# Patient Record
Sex: MALE | Race: ASIAN | NOT HISPANIC OR LATINO | URBAN - METROPOLITAN AREA
[De-identification: names, ages, dates, MRNs, and addresses within clinical notes are randomized per-mention and may not be internally consistent; named-entity substitution may affect disease eponyms.]

---

## 2019-09-24 ENCOUNTER — EMERGENCY (EMERGENCY)
Facility: HOSPITAL | Age: 24
LOS: 1 days | Discharge: HOME | End: 2019-09-24
Attending: EMERGENCY MEDICINE | Admitting: EMERGENCY MEDICINE
Payer: SELF-PAY

## 2019-09-24 VITALS
TEMPERATURE: 98 F | SYSTOLIC BLOOD PRESSURE: 112 MMHG | DIASTOLIC BLOOD PRESSURE: 76 MMHG | RESPIRATION RATE: 16 BRPM | HEART RATE: 85 BPM | OXYGEN SATURATION: 98 %

## 2019-09-24 VITALS
SYSTOLIC BLOOD PRESSURE: 115 MMHG | DIASTOLIC BLOOD PRESSURE: 78 MMHG | OXYGEN SATURATION: 98 % | WEIGHT: 119.93 LBS | TEMPERATURE: 98 F | HEART RATE: 85 BPM | RESPIRATION RATE: 18 BRPM

## 2019-09-24 RX ORDER — TETANUS TOXOID, REDUCED DIPHTHERIA TOXOID AND ACELLULAR PERTUSSIS VACCINE, ADSORBED 5; 2.5; 8; 8; 2.5 [IU]/.5ML; [IU]/.5ML; UG/.5ML; UG/.5ML; UG/.5ML
0.5 SUSPENSION INTRAMUSCULAR ONCE
Refills: 0 | Status: COMPLETED | OUTPATIENT
Start: 2019-09-24 | End: 2019-09-24

## 2019-09-24 RX ADMIN — TETANUS TOXOID, REDUCED DIPHTHERIA TOXOID AND ACELLULAR PERTUSSIS VACCINE, ADSORBED 0.5 MILLILITER(S): 5; 2.5; 8; 8; 2.5 SUSPENSION INTRAMUSCULAR at 22:29
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
## 2019-09-24 NOTE — ED ADULT NURSE NOTE - NSIMPLEMENTINTERV_GEN_ALL_ED
Implemented All Universal Safety Interventions:  [place] to call system. Call bell, personal items and telephone within reach. Instruct patient to call for assistance. Room bathroom lighting operational. Non-slip footwear when patient is off stretcher. Physically safe environment: no spills, clutter or unnecessary equipment. Stretcher in lowest position, wheels locked, appropriate side rails in place.

## 2019-09-24 NOTE — ED PROVIDER NOTE - PATIENT PORTAL LINK FT
You can access the FollowMyHealth Patient Portal offered by [facility] by registering at the following website: [email or web address]. By joining [facility]’s FollowMyHealth portal, you will also be able to view your health information using other applications (apps) compatible with our system.
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
## 2019-09-24 NOTE — ED ADULT NURSE NOTE - CHPI ED NUR SYMPTOMS NEG
no bruising/no acting out behaviors/no loss of consciousness/no fussiness/no back pain/no difficulty bearing weight/no disorientation/no dizziness/no crying/no decreased eating/drinking/no neck tenderness/no sleeping issues/no laceration

## 2020-05-08 ENCOUNTER — EMERGENCY (EMERGENCY)
Facility: HOSPITAL | Age: 25
LOS: 1 days | Discharge: HOME | End: 2020-05-08
Admitting: EMERGENCY MEDICINE
Payer: SELF-PAY

## 2020-05-08 VITALS
SYSTOLIC BLOOD PRESSURE: 124 MMHG | RESPIRATION RATE: 17 BRPM | OXYGEN SATURATION: 100 % | TEMPERATURE: 98 F | DIASTOLIC BLOOD PRESSURE: 82 MMHG | HEART RATE: 74 BPM

## 2020-05-08 VITALS
OXYGEN SATURATION: 100 % | RESPIRATION RATE: 18 BRPM | DIASTOLIC BLOOD PRESSURE: 79 MMHG | HEART RATE: 72 BPM | SYSTOLIC BLOOD PRESSURE: 128 MMHG | TEMPERATURE: 98 F

## 2020-05-08 PROBLEM — Z78.9 OTHER SPECIFIED HEALTH STATUS: Chronic | Status: ACTIVE | Noted: 2019-09-24

## 2020-05-08 RX ORDER — BENZOCAINE AND MENTHOL 5; 1 G/100ML; G/100ML
1 LIQUID ORAL ONCE
Refills: 0 | Status: COMPLETED | OUTPATIENT
Start: 2020-05-08 | End: 2020-05-08

## 2020-05-08 RX ORDER — IBUPROFEN 200 MG
400 TABLET ORAL ONCE
Refills: 0 | Status: COMPLETED | OUTPATIENT
Start: 2020-05-08 | End: 2020-05-08

## 2020-05-08 RX ADMIN — BENZOCAINE AND MENTHOL 1 LOZENGE: 5; 1 LIQUID ORAL at 06:05

## 2020-05-08 RX ADMIN — Medication 400 MILLIGRAM(S): at 06:05

## 2020-05-08 NOTE — ED PROVIDER NOTE - CLINICAL SUMMARY MEDICAL DECISION MAKING FREE TEXT BOX
patient presenting with sore throat x today. PE unremarkable, no tonsillar erythema or exudate noted. patient will be treated with cepacol and ibuprofen. patient advised to f/u with pcp

## 2020-05-08 NOTE — ED ADULT NURSE NOTE - CHIEF COMPLAINT QUOTE
pt arrives w/ c/o sore throat and chills. pt states 3 weeks ago had sore throat and was gvn meds. pt states the sore throat is back. pt denies any fever, cough, chest pain, abdominal pain, nausea or vomiting. positive S1/positive S2

## 2020-05-08 NOTE — ED PROVIDER NOTE - OBJECTIVE STATEMENT
24 y/o M presenting with sore throat x today. He reports feeling after eating. He denies cough, fever, chills, n/v, HA, chest pain, SOB

## 2020-05-08 NOTE — ED PROVIDER NOTE - PATIENT PORTAL LINK FT
You can access the FollowMyHealth Patient Portal offered by [facility] by registering at the following website: [email or web address]. By joining [facility]’s FollowMyHealth portal, you will also be able to view your health information using other applications (apps) compatible with our system.

## 2020-05-08 NOTE — ED ADULT NURSE NOTE - OBJECTIVE STATEMENT
came into er stated that he has had a sore throat on and off since the 10th grade; stated that the pain comes and goes and is on the right side of his throat and he notices that it becomes sore after eating then it does away and comes back

## 2020-05-08 NOTE — ED ADULT TRIAGE NOTE - CHIEF COMPLAINT QUOTE
pt arrives w/ c/o sore throat and chills. pt states 3 weeks ago had sore throat and was gvn meds. pt states the sore throat is back. pt denies any fever, cough, chest pain, abdominal pain, nausea or vomiting.

## 2020-07-10 NOTE — ED ADULT TRIAGE NOTE - TEMPERATURE IN CELSIUS (DEGREES C)
[No Acute Distress] : no acute distress [Well Nourished] : well nourished [Well-Appearing] : well-appearing [Well Developed] : well developed [Normal Sclera/Conjunctiva] : normal sclera/conjunctiva [PERRL] : pupils equal round and reactive to light [EOMI] : extraocular movements intact [Normal Outer Ear/Nose] : the outer ears and nose were normal in appearance [No Lymphadenopathy] : no lymphadenopathy [No JVD] : no jugular venous distention [Normal Oropharynx] : the oropharynx was normal [Thyroid Normal, No Nodules] : the thyroid was normal and there were no nodules present 36.5 [Supple] : supple [Clear to Auscultation] : lungs were clear to auscultation bilaterally [No Respiratory Distress] : no respiratory distress  [No Accessory Muscle Use] : no accessory muscle use [Normal S1, S2] : normal S1 and S2 [Normal Rate] : normal rate  [Regular Rhythm] : with a regular rhythm [No Murmur] : no murmur heard [No Carotid Bruits] : no carotid bruits [No Varicosities] : no varicosities [No Abdominal Bruit] : a ~M bruit was not heard ~T in the abdomen [Pedal Pulses Present] : the pedal pulses are present [No Edema] : there was no peripheral edema [No Palpable Aorta] : no palpable aorta [Non Tender] : non-tender [Soft] : abdomen soft [No Extremity Clubbing/Cyanosis] : no extremity clubbing/cyanosis [Non-distended] : non-distended [No Masses] : no abdominal mass palpated [Normal Posterior Cervical Nodes] : no posterior cervical lymphadenopathy [No HSM] : no HSM [Normal Bowel Sounds] : normal bowel sounds [Normal Anterior Cervical Nodes] : no anterior cervical lymphadenopathy [No CVA Tenderness] : no CVA  tenderness [No Spinal Tenderness] : no spinal tenderness [No Joint Swelling] : no joint swelling [No Rash] : no rash [Grossly Normal Strength/Tone] : grossly normal strength/tone [Coordination Grossly Intact] : coordination grossly intact [Normal Gait] : normal gait [Deep Tendon Reflexes (DTR)] : deep tendon reflexes were 2+ and symmetric [No Focal Deficits] : no focal deficits [Normal Affect] : the affect was normal [Normal Insight/Judgement] : insight and judgment were intact

## 2025-06-30 ENCOUNTER — OUTPATIENT (OUTPATIENT)
Dept: OUTPATIENT SERVICES | Facility: HOSPITAL | Age: 30
LOS: 1 days | End: 2025-06-30
Payer: MEDICAID

## 2025-06-30 DIAGNOSIS — Z94.2 LUNG TRANSPLANT STATUS: Chronic | ICD-10-CM

## 2025-06-30 DIAGNOSIS — Z94.2 LUNG TRANSPLANT STATUS: ICD-10-CM

## 2025-06-30 PROCEDURE — 71046 X-RAY EXAM CHEST 2 VIEWS: CPT

## 2025-07-14 ENCOUNTER — NON-APPOINTMENT (OUTPATIENT)
Age: 30
End: 2025-07-14

## 2025-07-14 ENCOUNTER — LABORATORY RESULT (OUTPATIENT)
Age: 30
End: 2025-07-14

## 2025-07-14 ENCOUNTER — APPOINTMENT (OUTPATIENT)
Dept: RADIOLOGY | Facility: CLINIC | Age: 30
End: 2025-07-14
Payer: MEDICAID

## 2025-07-14 PROCEDURE — 71046 X-RAY EXAM CHEST 2 VIEWS: CPT | Mod: 26

## 2025-07-16 ENCOUNTER — APPOINTMENT (OUTPATIENT)
Dept: PULMONOLOGY | Facility: CLINIC | Age: 30
End: 2025-07-16
Payer: MEDICAID

## 2025-07-16 VITALS
WEIGHT: 128 LBS | RESPIRATION RATE: 18 BRPM | DIASTOLIC BLOOD PRESSURE: 75 MMHG | HEART RATE: 71 BPM | SYSTOLIC BLOOD PRESSURE: 115 MMHG | HEIGHT: 66 IN | OXYGEN SATURATION: 100 % | TEMPERATURE: 98.1 F | BODY MASS INDEX: 20.57 KG/M2

## 2025-07-16 PROCEDURE — 99215 OFFICE O/P EST HI 40 MIN: CPT | Mod: 25

## 2025-07-16 PROCEDURE — 94010 BREATHING CAPACITY TEST: CPT

## 2025-07-16 PROCEDURE — ZZZZZ: CPT

## 2025-07-16 NOTE — HISTORY OF PRESENT ILLNESS
[TextBox_4] : PRE Transplant History: 30 year old male PVOD associated with right heart failure who presented with acute hypoxic respiratory failure and cardiogenic shock. Admitted to CCU and treated with Shankar and milrinone. Initiated on Epoprostenol  CT Chest showed centrilobular ground-glass nodules, interlobular septal thickening, and mediastinal lymphadenopathy. Course complicated by pneumothorax following placement of PAC. He underwent bronchoscopy and EBUS on 12/10/24. Lymph node negative for malignancy.  Following stabilization in the ICU patient was transferred to RCU 24. Tested positive for RSV 24 s/p course of Ribavirin. Evaluated by dental 24 for jaw pain and found to have inflamed gingiva around #17 distal and lingual, with generalized plaque accumulation and gross debris/plaque s/p irrigation and cleaning. No active infection noted. RRT on 24 in setting of difficulty with Flolan infusion running through PICC Line. Flolan was subsequently changed to R IJ but then became symptomatic with tachycardia, flushing and chest pain. Patient was transferred to MICU for further monitoring. Transferred back to RCU 24. Left anterior chest wall Pandya catheter placemed for Flolan infusion 24.  Listed: Surgery: BILATERAL LUNG TRANSPLANT 25 Donor: CMV + / EBV + / Toxo - , PHS risk Y/N Recipient: CMV + / EBV + / Toxo - Induction: simluect 20mg x2, medrol 1 g  Total Ischemic Time: RIGHT LUN" LEFT LUN" Extubated:25  Hospital Course: Bilateral lung transplant 25. Extubated on 25. Postoperative course notable for right main stem bronchus with necrotic material and persistent air leak requiring prolonged chest tube placement. Fluid cultures 25 grew penicillium and paecilomyces for which he is on posaconazole. Also developed an acute L IJ DVT treated with lose dose Eliquis. He also tested positive for COVID initially on 3/4/25 treated with a 5 day course of Remdesivir. He remained asymptomatic. Patient discharged home 3/17/25.  TELE-Visit 3/19/2025: Initial visit is remote given positive COVID. He remains asymptomatic. Feels somewhat tired after climbing stairs at home. Also notes some chest tightness around the chest, which was present during his hospital stay. He is taking his medications as instructed using his pill box, which he had on him during the visit. He is taking strict aspiration precautions, which is also being enforced by his family. He is staying with his brother and designated caretaker Jayson, who is also present for the visit. He is ambulating daily without issue. Patient has vitals available for review, which were checked at home. Reported SpO2 97 - 99% on room air. /75. T 97.4 F. Wt 129 lbs.  CLINIC 3/26/2025: Patient presents to clinic for routine follow up appointment post hospital discharge. Recent COVID infection, last RVP negative, SPI improved, best to date, denies respiratory complications. Complained of sore throat/esophageal pain X 3 days. Thyroid assessed, appears normal, thyroid panel and DSA/Allosure drawn. Chest tube sutures removed, incisions healing well, no signs of infection noted. Medications reviewed with patient and brother, patient and brother verbalized understanding.  CLINIC 25: Slight decline in spirometry, not concerning as home sandy trend is stable, DSA/Allosure repeated for further surveillance. Patient complained of sore thrat X1-2 days, denies sick contact, RVP swab sent. Patient also complained of numbness and tingling in hands and fingers, will reassess next week and consider adding gabapentin. IS/Oi medications are stable; no changes this week. Routine labs to be repeated on Monday  CLINIC 25: RVP 4/3 + enterovirus and COVID. Started on a prednisone taper starting at 40 mg daily. Has further decline in spirometry from 2.63 last week to 2.49 today. Allosure was checked 3/26/25 and was 0.06%. DSA 0%. Has mild sore throat. No fevers. No shortness of breath. Patient's brother accompanied him today. Patient reports compliance with medications. His brother states that he is trying to encourage more physical activity.  Readmitted 4/10 - 25 for concern for respiratory symptoms, COVID infection with worsening PFTs. CT chest negative, + enterovirus on RVP. Covid was negative. Transplant ID consulted. Completed empiric course of levaquin inpatient.  CLINIC 2025: Mr. Buck was seen and examined by me and the ACP [facility] [address] [facility] [address] [facility] a [facility] office and I reviewed his data which was extensive the day before the scheduled visitation completely including lab data and radiographic information.  CLINIC 2025: CLINIC 2025: Patient clinically stable, spirometry improved, denies respiratory complications. Cellecpt increased to 500 mg BID. Posa decreased to 200mg as trough is 2.7. Metformin 500mg daily started by Dr. Claire on ; discontinued today as Metformin is not a preferred drug of choice for post-transplant patients. an appointment was facilitated with Dr. Toro on 25.  CLINIC 2025: Patient presents for routine follow up appointment. Reports feeling well. No respiratory complaints. Has intermittent feeling of chest tightness not associated with dyspnea. Also notes stiff legs in the middle of the night which improves during daytime. He reports compliance with medications.  CLINIC 2025: Patient presents for routine follow up appointment. Spirometry improved from last. Denies respiratory complications. Complained of neuropathy around incision sites, incisions are healed, no signs of infection. Patient also endorses leg cramps and stiffness., increase hydration with electrolytes encouraged. Patient states he has moved back to parent's house with his wife and son. Reports wife now helps him with medication and pill box.  CLINIC 2025: Spirometry declined. Stable respiratory status. Denies SOB or cough. Fell yesterday with associated head strike. Has bruise on scalp. Painful to touch. Also has mild right knee pain. Denies LOC. Denies headache. Sent patient to the ER for CT of head. DSA/Allosure drawn and sent today for declined Spirometry. Patient to follow up with Dr. Garcia to evaluate the need of Eliquis. Tacrolimus increased to 0.5mg BID and Cellcept increased to 750mg BID. Medication changes reviewed with patient, med-action updated and provided.  CLINIC 25: Spirometry improved, denies respiratory complications, reports doing well. Patient stated having right leg and right-hand cramps and stiffness, recommended to increase hydration. Pending appointment for Duplex to re-evaluate Left IJ DVT and possibly discontinuing Eliquis.  CLINIC 2025: Patients' spirometry stable, no complaints of respiratory issues. Tacro increased to 1mg BID, c/o muscle spasms, hydration encouraged. PAtient was referred to Neuropsychologist for cognitive impairment and patient has refused appointment. Lower ext dopplers completed- benign; Upper extremity dopplers scheduled for today.  CLINIC 2025: Spirometry improved, no respiratory complaints, reports doing well. Patient continues to complain of muscle spasm of B/L LE at times, hydration encourage and to remain active. Discussed with patient to be compliant with appointments. Updated med-action provided to patient.  CLINIC 2025: Spirometry stable, denies any respiratory complications, reports doing well. Lumbar sacral MRI ordered for continued muscle spasms and numbness to lower extremities. Patient reported heat rash to chest from being outside walking, Calamine lotion or Benadryl cream OTC recommended. Decreased Cellcept to 250mg BID due to neutropenia. updated Med-action provided to patient.  CLINIC 25: Spirometry stable, denies any respiratory complications, reports doing well. Lumbar sacral MRI ordered for continued muscle spasms and numbness to lower extremities. No changes otherwise. Patient will repeat Tacrolimus level on 7/10/25.   CLINIC 25: ***   DONOR CULTURES: CHECK UNET POD 1,3,7,10,14 2025: respiratory cx: pseudomonas fluoresecens 25 blood cx: ngtd 25 R BAL: <10k staph aureus 25 L BAL: <10k staph aureus 25 urine cx: ngtd 25: bld cx: staph epi?? - I'm not seeing this on unet?  CULTURES: : OR: rare yeast, rare mold, + pseudomonas flurosecens : OR: candida parapsilosis, s/p caspofungin : OR: rare penicillum 25 body fluid: rare candida, rare pseudomonas fluorescens 25 body fluid cx: rare candida parapsilosis, rare penicillium, rare paecilomyces 25: BAL: <10k growth 25 BAL: ngtd 25: NGTD 3/4/25: BAL + COVID (remdesivir 3/5 - 3/10) 3/7/25: RVP + COVID 3/13: RVP + COVID 2025: Rhinovirus->monitor/NGTD  TBBX: 1 MONTH: 3/4/25: A0Bx, C4D neg 3 MONTH: 2025 A0Bx GMS and C4d negative 6 MONTH: 2025 9 MONTH: 10/2025 12 MONTH: 2026   DSA: 1 WEEK: (high risk): 25: 0%, NO DSA 2 WEEK: (high risk): 25: cPRA 0% NO DSA >2000 Late 1 month SENT 3/4/25; no Dsa, CPRA0% Repeated 3/26: 0% 3 MONTH: 2025 0% Repeat 25 0% 6 MONTH: 2025 9 MONTH:10/2025 12 MONTH: 2026  ALLOSURE: Late 1 month: SENT 3/4/25: 0.19% Repeat 3/26: 0.06% Repeated 2025 3 MONTH: 2025 0.06% Repeat: Decline FEV1 2025: 0.08% 6 MONTH: 2025 9 MONTH: 10/2025 12 MONTH: 2026   SPIROMETRY  inpatient  FVC 2.1  FEV1 1.82 2025 inpatient  FVC 2.21 FEV1 2.08 2025 inpatient  FVC 1.54 FEV1 1.16 2025 inpatient  FVC 2.07 FEV1 2.05 3/3/2025 inpatient  FVC 1.94 FEV1 1.77 3/10/2025 inpatient  FVC 2.04 FEV1 1.85 3/13/2025 inpatient  FVC 2.21 FEV1 2.06 3/17/2025 inpatient  FVC 2.06 FEV1 1.94 3/26/2025 410 [facility]: FVC 2.98 FEV1 2.92 2025 800 Clinic: FVC 2.70 FEV1 2.63 2025 800 Clinic: FVC 2.61 FEV1 2.49 2025 800 Clinic: FVC 2.53 FEV1 2.40 2025 800 Clinic: FVC 2.69 FEV1 2.58 2025 410 Clinic FVC 2.65 FEV1 2.58 2025 800 Clinic: FVC 2.71 FEV1 2.66 2025 800 Clinic: FVC 2.32 FEV1 2.25 2025 800 Clinic: FVC 2.87 FEV1 2.73 2025 800 Clinic FVC 2.79 FEV1 2.71 2025 800 Clinic: FVC 2.92 FEV1 2.85 2025 800 Clinic: FVC 2.93 FEV1 2.81. 2025 800 Clinic: FVC 2.96 FEV1 2.81 2025 800 Clinic: *************************

## 2025-07-16 NOTE — ASSESSMENT
[FreeTextEntry1] : 29yo male newly dx with PAH & RV failure, former smoker. S/P Bilateral Lung Transplant 1/31/2025   #Lung TXP - 2/1/25 body fluid cx: rare candida parapsilosis, rare penicillium, rare paecilomyces-> Posa started - 3/4/2025: COVID-> admitted for remdesivir infusion  #IS GOAL 10-12 Tacro level 7/14/25: 14.1  Tacrolimus 1/0.5mg daily, decreased to 0.5 mg BID - Prednisone 17.5 mg - Cellcept 500 mg BID, decreased to 250mg BID for leukopenia  #OI - Valcyte 450 mg BID - Bactrim SS daily - Posaconazole 200 mg daily   #GI Hx of GERD - Protonix 40 mg daily  #HEM Left IJ DVT Plan: discontinue after 3 months (5/13/25) - Eliquis 2.5 mg BID, started 2/13/25 Repeat duplex prior to stopping   #ID Latent TB therapy - Isoniazid 300 mg daily - Pyridoxine 50 mg daily  #ENDO DMT2 - Januvia 100mg daily, started by endo 4/30/25    FOLLOW UP - Routine weekly labs 7/21/2025 - Spirometry reviewed, ***** - Upcoming 6-month bronchoscopy 7/17/2025 - Upcoming appointment with Dr. Garcia - Continue to follow up with Transplant ID - Continue to follow up with Endocrinology - Continue to follow up with Dr. Smith - Lipidologist   RTC in 1 weeks with Flushing & CXR  All questions answered, used teach back method, patient verbalized understanding. Above discussed with Dr. Sweeney

## 2025-07-16 NOTE — PHYSICAL EXAM
[No Acute Distress] : no acute distress [Normal Oropharynx] : normal oropharynx [Normal Appearance] : normal appearance [Normal Rate/Rhythm] : normal rate/rhythm [Normal S1, S2] : normal s1, s2 [No Murmurs] : no murmurs [No Resp Distress] : no resp distress [Clear to Auscultation Bilaterally] : clear to auscultation bilaterally [No Abnormalities] : no abnormalities [Benign] : benign [Normal Gait] : normal gait [No Cyanosis] : no cyanosis [No Edema] : no edema [FROM] : FROM [Normal Color/ Pigmentation] : normal color/ pigmentation [No Focal Deficits] : no focal deficits [Oriented x3] : oriented x3 [TextBox_132] : Normal LE strength bilaterally

## 2025-07-16 NOTE — HISTORY OF PRESENT ILLNESS
[TextBox_4] : PRE Transplant History: 30 year old male PVOD associated with right heart failure who presented with acute hypoxic respiratory failure and cardiogenic shock. Admitted to CCU and treated with Shankar and milrinone. Initiated on Epoprostenol  CT Chest showed centrilobular ground-glass nodules, interlobular septal thickening, and mediastinal lymphadenopathy. Course complicated by pneumothorax following placement of PAC. He underwent bronchoscopy and EBUS on 12/10/24. Lymph node negative for malignancy.  Following stabilization in the ICU patient was transferred to RCU 24. Tested positive for RSV 24 s/p course of Ribavirin. Evaluated by dental 24 for jaw pain and found to have inflamed gingiva around #17 distal and lingual, with generalized plaque accumulation and gross debris/plaque s/p irrigation and cleaning. No active infection noted. RRT on 24 in setting of difficulty with Flolan infusion running through PICC Line. Flolan was subsequently changed to R IJ but then became symptomatic with tachycardia, flushing and chest pain. Patient was transferred to MICU for further monitoring. Transferred back to RCU 24. Left anterior chest wall Pandya catheter placemed for Flolan infusion 24.  Listed: Surgery: BILATERAL LUNG TRANSPLANT 25 Donor: CMV + / EBV + / Toxo - , PHS risk Y/N Recipient: CMV + / EBV + / Toxo - Induction: simluect 20mg x2, medrol 1 g  Total Ischemic Time: RIGHT LUN" LEFT LUN" Extubated:25  Hospital Course: Bilateral lung transplant 25. Extubated on 25. Postoperative course notable for right main stem bronchus with necrotic material and persistent air leak requiring prolonged chest tube placement. Fluid cultures 25 grew penicillium and paecilomyces for which he is on posaconazole. Also developed an acute L IJ DVT treated with lose dose Eliquis. He also tested positive for COVID initially on 3/4/25 treated with a 5 day course of Remdesivir. He remained asymptomatic. Patient discharged home 3/17/25.  TELE-Visit 3/19/2025: Initial visit is remote given positive COVID. He remains asymptomatic. Feels somewhat tired after climbing stairs at home. Also notes some chest tightness around the chest, which was present during his hospital stay. He is taking his medications as instructed using his pill box, which he had on him during the visit. He is taking strict aspiration precautions, which is also being enforced by his family. He is staying with his brother and designated caretaker Jayson, who is also present for the visit. He is ambulating daily without issue. Patient has vitals available for review, which were checked at home. Reported SpO2 97 - 99% on room air. /75. T 97.4 F. Wt 129 lbs.  CLINIC 3/26/2025: Patient presents to clinic for routine follow up appointment post hospital discharge. Recent COVID infection, last RVP negative, SPI improved, best to date, denies respiratory complications. Complained of sore throat/esophageal pain X 3 days. Thyroid assessed, appears normal, thyroid panel and DSA/Allosure drawn. Chest tube sutures removed, incisions healing well, no signs of infection noted. Medications reviewed with patient and brother, patient and brother verbalized understanding.  CLINIC 25: Slight decline in spirometry, not concerning as home sandy trend is stable, DSA/Allosure repeated for further surveillance. Patient complained of sore thrat X1-2 days, denies sick contact, RVP swab sent. Patient also complained of numbness and tingling in hands and fingers, will reassess next week and consider adding gabapentin. IS/Oi medications are stable; no changes this week. Routine labs to be repeated on Monday  CLINIC 25: RVP 4/3 + enterovirus and COVID. Started on a prednisone taper starting at 40 mg daily. Has further decline in spirometry from 2.63 last week to 2.49 today. Allosure was checked 3/26/25 and was 0.06%. DSA 0%. Has mild sore throat. No fevers. No shortness of breath. Patient's brother accompanied him today. Patient reports compliance with medications. His brother states that he is trying to encourage more physical activity.  Readmitted 4/10 - 25 for concern for respiratory symptoms, COVID infection with worsening PFTs. CT chest negative, + enterovirus on RVP. Covid was negative. Transplant ID consulted. Completed empiric course of levaquin inpatient.  CLINIC 2025: Mr. uBck was seen and examined by me and the ACP on [facility] [address] [facility] [address] in a transplant office and I reviewed his data which was extensive the day before the scheduled visitation completely including lab data and radiographic information.  CLINIC 2025: CLINIC 2025: Patient clinically stable, spirometry improved, denies respiratory complications. Cellecpt increased to 500 mg BID. Posa decreased to 200mg as trough is 2.7. Metformin 500mg daily started by Dr. Claire on ; discontinued today as Metformin is not a preferred drug of choice for post-transplant patients. an appointment was facilitated with Dr. Toro on 25.  CLINIC 2025: Patient presents for routine follow up appointment. Reports feeling well. No respiratory complaints. Has intermittent feeling of chest tightness not associated with dyspnea. Also notes stiff legs in the middle of the night which improves during daytime. He reports compliance with medications.  CLINIC 2025: Patient presents for routine follow up appointment. Spirometry improved from last. Denies respiratory complications. Complained of neuropathy around incision sites, incisions are healed, no signs of infection. Patient also endorses leg cramps and stiffness., increase hydration with electrolytes encouraged. Patient states he has moved back to parent's house with his wife and son. Reports wife now helps him with medication and pill box.  CLINIC 2025: Spirometry declined. Stable respiratory status. Denies SOB or cough. Fell yesterday with associated head strike. Has bruise on scalp. Painful to touch. Also has mild right knee pain. Denies LOC. Denies headache. Sent patient to the ER for CT of head. DSA/Allosure drawn and sent today for declined Spirometry. Patient to follow up with Dr. Garcia to evaluate the need of Eliquis. Tacrolimus increased to 0.5mg BID and Cellcept increased to 750mg BID. Medication changes reviewed with patient, med-action updated and provided.  CLINIC 25: Spirometry improved, denies respiratory complications, reports doing well. Patient stated having right leg and right-hand cramps and stiffness, recommended to increase hydration. Pending appointment for Duplex to re-evaluate Left IJ DVT and possibly discontinuing Eliquis.  CLINIC 2025: Patients' spirometry stable, no complaints of respiratory issues. Tacro increased to 1mg BID, c/o muscle spasms, hydration encouraged. PAtient was referred to Neuropsychologist for cognitive impairment and patient has refused appointment. Lower ext dopplers completed- benign; Upper extremity dopplers scheduled for today.  CLINIC 2025: Spirometry improved, no respiratory complaints, reports doing well. Patient continues to complain of muscle spasm of B/L LE at times, hydration encourage and to remain active. Discussed with patient to be compliant with appointments. Updated med-action provided to patient.  CLINIC 2025: Spirometry stable, denies any respiratory complications, reports doing well. Lumbar sacral MRI ordered for continued muscle spasms and numbness to lower extremities. Patient reported heat rash to chest from being outside walking, Calamine lotion or Benadryl cream OTC recommended. Decreased Cellcept to 250mg BID due to neutropenia. updated Med-action provided to patient.  CLINIC 25: Spirometry stable, denies any respiratory complications, reports doing well. Lumbar sacral MRI ordered for continued muscle spasms and numbness to lower extremities. No changes otherwise. Patient will repeat Tacrolimus level on 7/10/25.   CLINIC 25: ***   DONOR CULTURES: CHECK UNET POD 1,3,7,10,14 2025: respiratory cx: pseudomonas fluoresecens 25 blood cx: ngtd 25 R BAL: <10k staph aureus 25 L BAL: <10k staph aureus 25 urine cx: ngtd 25: bld cx: staph epi?? - I'm not seeing this on unet?  CULTURES: : OR: rare yeast, rare mold, + pseudomonas flurosecens : OR: candida parapsilosis, s/p caspofungin : OR: rare penicillum 25 body fluid: rare candida, rare pseudomonas fluorescens 25 body fluid cx: rare candida parapsilosis, rare penicillium, rare paecilomyces 25: BAL: <10k growth 25 BAL: ngtd 25: NGTD 3/4/25: BAL + COVID (remdesivir 3/5 - 3/10) 3/7/25: RVP + COVID 3/13: RVP + COVID 2025: Rhinovirus->monitor/NGTD  TBBX: 1 MONTH: 3/4/25: A0Bx, C4D neg 3 MONTH: 2025 A0Bx GMS and C4d negative 6 MONTH: 2025 9 MONTH: 10/2025 12 MONTH: 2026   DSA: 1 WEEK: (high risk): 25: 0%, NO DSA 2 WEEK: (high risk): 25: cPRA 0% NO DSA >2000 Late 1 month SENT 3/4/25; no Dsa, CPRA0% Repeated 3/26: 0% 3 MONTH: 2025 0% Repeat 25 0% 6 MONTH: 2025 9 MONTH:10/2025 12 MONTH: 2026  ALLOSURE: Late 1 month: SENT 3/4/25: 0.19% Repeat 3/26: 0.06% Repeated 2025 3 MONTH: 2025 0.06% Repeat: Decline FEV1 2025: 0.08% 6 MONTH: 2025 9 MONTH: 10/2025 12 MONTH: 2026   SPIROMETRY  inpatient  FVC 2.1  FEV1 1.82 2025 inpatient  FVC 2.21 FEV1 2.08 2025 inpatient  FVC 1.54 FEV1 1.16 2025 inpatient  FVC 2.07 FEV1 2.05 3/3/2025 inpatient  FVC 1.94 FEV1 1.77 3/10/2025 inpatient  FVC 2.04 FEV1 1.85 3/13/2025 inpatient  FVC 2.21 FEV1 2.06 3/17/2025 inpatient  FVC 2.06 FEV1 1.94 3/26/2025 [facility]: FVC 2.98 FEV1 2.92 2025 [facility]: FVC 2.70 FEV1 2.63 2025 [facility]: FVC 2.61 FEV1 2.49 2025 800 [facility]: FVC 2.53 FEV1 2.40 2025 800 [facility]: FVC 2.69 FEV1 2.58 2025 410 [facility] FVC 2.65 FEV1 2.58 2025 800 [facility]: FVC 2.71 FEV1 2.66 2025 800 [facility]: FVC 2.32 FEV1 2.25 2025 800 [facility]: FVC 2.87 FEV1 2.73 2025 800 [facility] FVC 2.79 FEV1 2.71 2025 800 [facility]: FVC 2.92 FEV1 2.85 2025 800 [facility]: FVC 2.93 FEV1 2.81. 2025 800 [facility]: FVC 2.96 FEV1 2.81 2025 800 [facility]: *************************

## 2025-07-16 NOTE — HISTORY OF PRESENT ILLNESS
[TextBox_4] : PRE Transplant History: 30 year old male PVOD associated with right heart failure who presented with acute hypoxic respiratory failure and cardiogenic shock. Admitted to CCU and treated with Shankar and milrinone. Initiated on Epoprostenol  CT Chest showed centrilobular ground-glass nodules, interlobular septal thickening, and mediastinal lymphadenopathy. Course complicated by pneumothorax following placement of PAC. He underwent bronchoscopy and EBUS on 12/10/24. Lymph node negative for malignancy.  Following stabilization in the ICU patient was transferred to RCU 24. Tested positive for RSV 24 s/p course of Ribavirin. Evaluated by dental 24 for jaw pain and found to have inflamed gingiva around #17 distal and lingual, with generalized plaque accumulation and gross debris/plaque s/p irrigation and cleaning. No active infection noted. RRT on 24 in setting of difficulty with Flolan infusion running through PICC Line. Flolan was subsequently changed to R IJ but then became symptomatic with tachycardia, flushing and chest pain. Patient was transferred to MICU for further monitoring. Transferred back to RCU 24. Left anterior chest wall Pandya catheter placemed for Flolan infusion 24.  Listed: Surgery: BILATERAL LUNG TRANSPLANT 25 Donor: CMV + / EBV + / Toxo - , PHS risk Y/N Recipient: CMV + / EBV + / Toxo - Induction: simluect 20mg x2, medrol 1 g  Total Ischemic Time: RIGHT LUN" LEFT LUN" Extubated:25  Hospital Course: Bilateral lung transplant 25. Extubated on 25. Postoperative course notable for right main stem bronchus with necrotic material and persistent air leak requiring prolonged chest tube placement. Fluid cultures 25 grew penicillium and paecilomyces for which he is on posaconazole. Also developed an acute L IJ DVT treated with lose dose Eliquis. He also tested positive for COVID initially on 3/4/25 treated with a 5 day course of Remdesivir. He remained asymptomatic. Patient discharged home 3/17/25.  TELE-Visit 3/19/2025: Initial visit is remote given positive COVID. He remains asymptomatic. Feels somewhat tired after climbing stairs at home. Also notes some chest tightness around the chest, which was present during his hospital stay. He is taking his medications as instructed using his pill box, which he had on him during the visit. He is taking strict aspiration precautions, which is also being enforced by his family. He is staying with his brother and designated caretaker Jayson, who is also present for the visit. He is ambulating daily without issue. Patient has vitals available for review, which were checked at home. Reported SpO2 97 - 99% on room air. /75. T 97.4 F. Wt 129 lbs.  CLINIC 3/26/2025: Patient presents to clinic for routine follow up appointment post hospital discharge. Recent COVID infection, last RVP negative, SPI improved, best to date, denies respiratory complications. Complained of sore throat/esophageal pain X 3 days. Thyroid assessed, appears normal, thyroid panel and DSA/Allosure drawn. Chest tube sutures removed, incisions healing well, no signs of infection noted. Medications reviewed with patient and brother, patient and brother verbalized understanding.  CLINIC 25: Slight decline in spirometry, not concerning as home sandy trend is stable, DSA/Allosure repeated for further surveillance. Patient complained of sore thrat X1-2 days, denies sick contact, RVP swab sent. Patient also complained of numbness and tingling in hands and fingers, will reassess next week and consider adding gabapentin. IS/Oi medications are stable; no changes this week. Routine labs to be repeated on Monday  CLINIC 25: RVP 4/3 + enterovirus and COVID. Started on a prednisone taper starting at 40 mg daily. Has further decline in spirometry from 2.63 last week to 2.49 today. Allosure was checked 3/26/25 and was 0.06%. DSA 0%. Has mild sore throat. No fevers. No shortness of breath. Patient's brother accompanied him today. Patient reports compliance with medications. His brother states that he is trying to encourage more physical activity.  Readmitted 4/10 - 25 for concern for respiratory symptoms, COVID infection with worsening PFTs. CT chest negative, + enterovirus on RVP. Covid was negative. Transplant ID consulted. Completed empiric course of levaquin inpatient.  CLINIC 2025: Mr. Buck was seen and examined by me and the ACP on 2025 at [address] [facility] [address] in a transplant office and I reviewed his data which was extensive the day before the scheduled visitation completely including lab data and radiographic information.  CLINIC 2025: CLINIC 2025: Patient clinically stable, spirometry improved, denies respiratory complications. Cellecpt increased to 500 mg BID. Posa decreased to 200mg as trough is 2.7. Metformin 500mg daily started by Dr. Claire on ; discontinued today as Metformin is not a preferred drug of choice for post-transplant patients. an appointment was facilitated with Dr. Toro on 25.  CLINIC 2025: Patient presents for routine follow up appointment. Reports feeling well. No respiratory complaints. Has intermittent feeling of chest tightness not associated with dyspnea. Also notes stiff legs in the middle of the night which improves during daytime. He reports compliance with medications.  CLINIC 2025: Patient presents for routine follow up appointment. Spirometry improved from last. Denies respiratory complications. Complained of neuropathy around incision sites, incisions are healed, no signs of infection. Patient also endorses leg cramps and stiffness., increase hydration with electrolytes encouraged. Patient states he has moved back to parent's house with his wife and son. Reports wife now helps him with medication and pill box.  CLINIC 2025: Spirometry declined. Stable respiratory status. Denies SOB or cough. Fell yesterday with associated head strike. Has bruise on scalp. Painful to touch. Also has mild right knee pain. Denies LOC. Denies headache. Sent patient to the ER for CT of head. DSA/Allosure drawn and sent today for declined Spirometry. Patient to follow up with Dr. Garcia to evaluate the need of Eliquis. Tacrolimus increased to 0.5mg BID and Cellcept increased to 750mg BID. Medication changes reviewed with patient, med-action updated and provided.  CLINIC 25: Spirometry improved, denies respiratory complications, reports doing well. Patient stated having right leg and right-hand cramps and stiffness, recommended to increase hydration. Pending appointment for Duplex to re-evaluate Left IJ DVT and possibly discontinuing Eliquis.  CLINIC 2025: Patients' spirometry stable, no complaints of respiratory issues. Tacro increased to 1mg BID, c/o muscle spasms, hydration encouraged. PAtient was referred to Neuropsychologist for cognitive impairment and patient has refused appointment. Lower ext dopplers completed- benign; Upper extremity dopplers scheduled for today.  CLINIC 2025: Spirometry improved, no respiratory complaints, reports doing well. Patient continues to complain of muscle spasm of B/L LE at times, hydration encourage and to remain active. Discussed with patient to be compliant with appointments. Updated med-action provided to patient.  CLINIC 2025: Spirometry stable, denies any respiratory complications, reports doing well. Lumbar sacral MRI ordered for continued muscle spasms and numbness to lower extremities. Patient reported heat rash to chest from being outside walking, Calamine lotion or Benadryl cream OTC recommended. Decreased Cellcept to 250mg BID due to neutropenia. updated Med-action provided to patient.  CLINIC 25: Spirometry stable, denies any respiratory complications, reports doing well. Lumbar sacral MRI ordered for continued muscle spasms and numbness to lower extremities. No changes otherwise. Patient will repeat Tacrolimus level on 7/10/25.   CLINIC 25: ***   DONOR CULTURES: CHECK UNET POD 1,3,7,10,14 2025: respiratory cx: pseudomonas fluoresecens 25 blood cx: ngtd 25 R BAL: <10k staph aureus 25 L BAL: <10k staph aureus 25 urine cx: ngtd 25: bld cx: staph epi?? - I'm not seeing this on unet?  CULTURES: : OR: rare yeast, rare mold, + pseudomonas flurosecens : OR: candida parapsilosis, s/p caspofungin : OR: rare penicillum 25 body fluid: rare candida, rare pseudomonas fluorescens 25 body fluid cx: rare candida parapsilosis, rare penicillium, rare paecilomyces 25: BAL: <10k growth 25 BAL: ngtd 25: NGTD 3/4/25: BAL + COVID (remdesivir 3/5 - 3/10) 3/7/25: RVP + COVID 3/13: RVP + COVID 2025: Rhinovirus->monitor/NGTD  TBBX: 1 MONTH: 3/4/25: A0Bx, C4D neg 3 MONTH: 2025 A0Bx GMS and C4d negative 6 MONTH: 2025 9 MONTH: 10/2025 12 MONTH: 2026   DSA: 1 WEEK: (high risk): 25: 0%, NO DSA 2 WEEK: (high risk): 25: cPRA 0% NO DSA >2000 Late 1 month SENT 3/4/25; no Dsa, CPRA0% Repeated 3/26: 0% 3 MONTH: 2025 0% Repeat 25 0% 6 MONTH: 2025 9 MONTH:10/2025 12 MONTH: 2026  ALLOSURE: Late 1 month: SENT 3/4/25: 0.19% Repeat 3/26: 0.06% Repeated 2025 3 MONTH: 2025 0.06% Repeat: Decline FEV1 2025: 0.08% 6 MONTH: 2025 9 MONTH: 10/2025 12 MONTH: 2026   SPIROMETRY  inpatient  FVC 2.1  FEV1 1.82 2025 inpatient  FVC 2.21 FEV1 2.08 2025 inpatient  FVC 1.54 FEV1 1.16 2025 inpatient  FVC 2.07 FEV1 2.05 3/3/2025 inpatient  FVC 1.94 FEV1 1.77 3/10/2025 inpatient  FVC 2.04 FEV1 1.85 3/13/2025 inpatient  FVC 2.21 FEV1 2.06 3/17/2025 inpatient  FVC 2.06 FEV1 1.94 3/26/2025 [facility]: FVC 2.98 FEV1 2.92 2025 [facility]: FVC 2.70 FEV1 2.63 2025 800 [facility]: FVC 2.61 FEV1 2.49 2025 800 [facility]: FVC 2.53 FEV1 2.40 2025 800 [facility]: FVC 2.69 FEV1 2.58 2025 410 [facility] FVC 2.65 FEV1 2.58 2025 800 [facility]: FVC 2.71 FEV1 2.66 2025 800 [facility]: FVC 2.32 FEV1 2.25 2025 800 [facility]: FVC 2.87 FEV1 2.73 2025 800 [facility] FVC 2.79 FEV1 2.71 2025 800 [facility]: FVC 2.92 FEV1 2.85 2025 800 [facility]: FVC 2.93 FEV1 2.81. 2025 800 [facility]: FVC 2.96 FEV1 2.81 2025 800 [facility]: *************************

## 2025-07-16 NOTE — ASSESSMENT
[FreeTextEntry1] : 29yo male newly dx with PAH & RV failure, former smoker. S/P Bilateral Lung Transplant 1/31/2025   #Lung TXP - 2/1/25 body fluid cx: rare candida parapsilosis, rare penicillium, rare paecilomyces-> Posa started - 3/4/2025: COVID-> admitted for remdesivir infusion  #IS GOAL 10-12 Tacro level 7/14/25: 14.1  Tacrolimus 1/0.5mg daily, decreased to 0.5 mg BID - Prednisone 17.5 mg - Cellcept 500 mg BID, decreased to 250mg BID for leukopenia  #OI - Valcyte 450 mg BID - Bactrim SS daily - Posaconazole 200 mg daily   #GI Hx of GERD - Protonix 40 mg daily  #HEM Left IJ DVT Plan: discontinue after 3 months (5/13/25) - Eliquis 2.5 mg BID, started 2/13/25 Repeat duplex prior to stopping   #ID Latent TB therapy - Isoniazid 300 mg daily - Pyridoxine 50 mg daily  #ENDO DMT2 - Januvia 100mg daily, started by endo 4/30/25    FOLLOW UP - Routine weekly labs 7/21/2025 - Spirometry reviewed, ***** - Upcoming 6-month bronchoscopy 7/17/2025 - Upcoming appointment with Dr. Garcia - Continue to follow up with Transplant ID - Continue to follow up with Endocrinology - Continue to follow up with Dr. Smith - Lipidologist   RTC in 1 weeks with [place] & CXR  All questions answered, used teach back method, patient verbalized understanding. Above discussed with Dr. Sweeney

## 2025-07-16 NOTE — ASSESSMENT
[FreeTextEntry1] : 29yo male newly dx with PAH & RV failure, former smoker. S/P Bilateral Lung Transplant 1/31/2025   #Lung TXP - 2/1/25 body fluid cx: rare candida parapsilosis, rare penicillium, rare paecilomyces-> Posa started - 3/4/2025: COVID-> admitted for remdesivir infusion  #IS GOAL 10-12 Tacro level 7/14/25: 14.1  Tacrolimus 1/0.5mg daily, decreased to 0.5 mg BID - Prednisone 17.5 mg - Cellcept 500 mg BID, decreased to 250mg BID for leukopenia  #OI - Valcyte 450 mg BID - Bactrim SS daily - Posaconazole 200 mg daily   #GI Hx of GERD - Protonix 40 mg daily  #HEM Left IJ DVT Plan: discontinue after 3 months (5/13/25) - Eliquis 2.5 mg BID, started 2/13/25 Repeat duplex prior to stopping   #ID Latent TB therapy - Isoniazid 300 mg daily - Pyridoxine 50 mg daily  #ENDO DMT2 - Januvia 100mg daily, started by endo 4/30/25    FOLLOW UP - Routine weekly labs 7/21/2025 - Spirometry reviewed, ***** - Upcoming 6-month bronchoscopy 7/17/2025 - Upcoming appointment with Dr. Garcia - Continue to follow up with Transplant ID - Continue to follow up with Endocrinology - Continue to follow up with Dr. Smith - Lipidologist   RTC in 1 weeks with [place] & CXR  All questions answered, used teach back method, patient verbalized understanding. Above discussed with Dr. Sweeney

## 2025-07-17 ENCOUNTER — TRANSCRIPTION ENCOUNTER (OUTPATIENT)
Age: 30
End: 2025-07-17

## 2025-07-17 ENCOUNTER — RESULT REVIEW (OUTPATIENT)
Age: 30
End: 2025-07-17

## 2025-07-17 ENCOUNTER — APPOINTMENT (OUTPATIENT)
Dept: PULMONOLOGY | Facility: CLINIC | Age: 30
End: 2025-07-17

## 2025-07-18 ENCOUNTER — NON-APPOINTMENT (OUTPATIENT)
Age: 30
End: 2025-07-18

## 2025-07-20 NOTE — END OF VISIT
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